# Patient Record
Sex: MALE | Race: WHITE | NOT HISPANIC OR LATINO | URBAN - METROPOLITAN AREA
[De-identification: names, ages, dates, MRNs, and addresses within clinical notes are randomized per-mention and may not be internally consistent; named-entity substitution may affect disease eponyms.]

---

## 2018-09-10 ENCOUNTER — OFFICE VISIT (OUTPATIENT)
Dept: RETAIL CLINIC | Facility: CLINIC | Age: 23
End: 2018-09-10

## 2018-09-10 VITALS
OXYGEN SATURATION: 98 % | SYSTOLIC BLOOD PRESSURE: 108 MMHG | RESPIRATION RATE: 18 BRPM | TEMPERATURE: 97.7 F | BODY MASS INDEX: 22.54 KG/M2 | HEART RATE: 78 BPM | HEIGHT: 70 IN | WEIGHT: 157.4 LBS | DIASTOLIC BLOOD PRESSURE: 64 MMHG

## 2018-09-10 DIAGNOSIS — H61.23 EXCESSIVE CERUMEN IN EAR CANAL, BILATERAL: ICD-10-CM

## 2018-09-10 DIAGNOSIS — J45.40 MODERATE PERSISTENT ASTHMA WITHOUT COMPLICATION: Primary | ICD-10-CM

## 2018-09-10 PROCEDURE — 99203 OFFICE O/P NEW LOW 30 MIN: CPT | Performed by: NURSE PRACTITIONER

## 2018-09-10 RX ORDER — ALBUTEROL SULFATE 90 UG/1
2 AEROSOL, METERED RESPIRATORY (INHALATION) EVERY 4 HOURS PRN
Qty: 1 INHALER | Refills: 0 | Status: SHIPPED | OUTPATIENT
Start: 2018-09-10 | End: 2018-09-20

## 2018-09-10 NOTE — PROGRESS NOTES
KEYLALaurence Rojo is a 23 y.o. male.   Chief Complaint   Patient presents with   • Med Refill     FRANCES Sanchez has a history of asthma, has been out of his inhaler since April 2018.  SOA & cough is worse at night.  He just moved here, doesn't have a PCP at this time.      Shortness of Breath   This is a chronic problem. The current episode started more than 1 year ago. The problem occurs intermittently. The problem has been unchanged. Associated symptoms include chest pain (with cough), ear pain (left), headaches, rhinorrhea and wheezing. Pertinent negatives include no abdominal pain, claudication, coryza, fever, hemoptysis, leg pain, leg swelling, neck pain, orthopnea, PND, rash, sore throat, sputum production, swollen glands, syncope or vomiting. The symptoms are aggravated by lying flat (warm air). The patient has no known risk factors for DVT/PE. He has tried cool air (hot showers, cool showers) for the symptoms. The treatment provided mild relief. His past medical history is significant for asthma.        The following portions of the patient's history were reviewed and updated as appropriate: allergies, current medications, past family history, past medical history, past social history, past surgical history and problem list.    Current Outpatient Prescriptions:   •  albuterol (PROVENTIL HFA;VENTOLIN HFA) 108 (90 Base) MCG/ACT inhaler, Inhale 2 puffs Every 4 (Four) Hours As Needed for Wheezing or Shortness of Air for up to 10 days., Disp: 1 inhaler, Rfl: 0  •  carbamide peroxide (DEBROX) 6.5 % otic solution, Administer 5 drops into both ears As Needed for Ear Pain., Disp: 14.8 mL, Rfl: 0    No Known Allergies    Review of Systems   Constitutional: Negative for appetite change, chills, fatigue and fever.   HENT: Positive for ear pain (left) and rhinorrhea. Negative for congestion, postnasal drip, sinus pressure, sneezing, sore throat and tinnitus.    Eyes: Negative for redness and  "itching.   Respiratory: Positive for cough, chest tightness (non-productive), shortness of breath and wheezing. Negative for hemoptysis and sputum production.    Cardiovascular: Positive for chest pain (with cough). Negative for palpitations, orthopnea, claudication, leg swelling, syncope and PND.   Gastrointestinal: Negative for abdominal pain, diarrhea, nausea and vomiting.   Musculoskeletal: Negative for neck pain.   Skin: Negative for rash.   Neurological: Positive for headaches. Negative for dizziness and light-headedness.       Objective     Visit Vitals  /64 (BP Location: Left arm, Patient Position: Sitting, Cuff Size: Adult)   Pulse 78   Temp 97.7 °F (36.5 °C) (Oral)   Resp 18   Ht 177.8 cm (70\")   Wt 71.4 kg (157 lb 6.4 oz)   SpO2 98%   BMI 22.58 kg/m²         Physical Exam   Constitutional: He is oriented to person, place, and time. He appears well-developed and well-nourished. No distress.   HENT:   Head: Normocephalic.   Right Ear: Tympanic membrane and external ear normal.   Left Ear: Tympanic membrane and external ear normal.   Nose: Nose normal. No sinus tenderness.   Mouth/Throat: Uvula is midline, oropharynx is clear and moist and mucous membranes are normal.   Excess cerumen bilateral ear canals   Eyes: Conjunctivae are normal.   Cardiovascular: Normal rate, regular rhythm and normal heart sounds.    Pulmonary/Chest: Effort normal. No accessory muscle usage. No respiratory distress. He has no decreased breath sounds. Wheezes: expiratory, scattered.   Lymphadenopathy:     He has no cervical adenopathy.   Neurological: He is alert and oriented to person, place, and time.       Lab Results (last 24 hours)     ** No results found for the last 24 hours. **          Assessment/Plan   Daniel was seen today for med refill.    Diagnoses and all orders for this visit:    Moderate persistent asthma without complication  -     albuterol (PROVENTIL HFA;VENTOLIN HFA) 108 (90 Base) MCG/ACT inhaler; Inhale " 2 puffs Every 4 (Four) Hours As Needed for Wheezing or Shortness of Air for up to 10 days.  - Daniel to schedule appointment to establish care with local PCP    Excessive cerumen in ear canal, bilateral  -     carbamide peroxide (DEBROX) 6.5 % otic solution; Administer 5 drops into both ears As Needed for Ear Pain.  - Do not use QTips to clean ears    An After Visit Summary was reviewed, printed and given to the patient.  Understanding verbalized and patient agreed with treatment plan.  If symptom(s) worsen or fail to improve, return to clinic, follow up with PCP or go to [facility].

## 2018-09-10 NOTE — PATIENT INSTRUCTIONS
Asthma, Adult  Asthma is a condition of the lungs in which the airways tighten and narrow. Asthma can make it hard to breathe. Asthma cannot be cured, but medicine and lifestyle changes can help control it. Asthma may be started (triggered) by:  · Animal skin flakes (dander).  · Dust.  · Cockroaches.  · Pollen.  · Mold.  · Smoke.  · Cleaning products.  · Hair sprays or aerosol sprays.  · Paint fumes or strong smells.  · Cold air, weather changes, and winds.  · Crying or laughing hard.  · Stress.  · Certain medicines or drugs.  · Foods, such as dried fruit, potato chips, and sparkling grape juice.  · Infections or conditions (colds, flu).  · Exercise.  · Certain medical conditions or diseases.  · Exercise or tiring activities.    Follow these instructions at home:  · Take medicine as told by your doctor.  · Use a peak flow meter as told by your doctor. A peak flow meter is a tool that measures how well the lungs are working.  · Record and keep track of the peak flow meter's readings.  · Understand and use the asthma action plan. An asthma action plan is a written plan for taking care of your asthma and treating your attacks.  · To help prevent asthma attacks:  ? Do not smoke. Stay away from secondhand smoke.  ? Change your heating and air conditioning filter often.  ? Limit your use of fireplaces and wood stoves.  ? Get rid of pests (such as roaches and mice) and their droppings.  ? Throw away plants if you see mold on them.  ? Clean your floors. Dust regularly. Use cleaning products that do not smell.  ? Have someone vacuum when you are not home. Use a vacuum  with a HEPA filter if possible.  ? Replace carpet with wood, tile, or vinyl carson. Carpet can trap animal skin flakes and dust.  ? Use allergy-proof pillows, mattress covers, and box spring covers.  ? Wash bed sheets and blankets every week in hot water and dry them in a dryer.  ? Use blankets that are made of polyester or cotton.  ? Clean bathrooms  and darryl with bleach. If possible, have someone repaint the walls in these rooms with mold-resistant paint. Keep out of the rooms that are being cleaned and painted.  ? Wash hands often.  Contact a doctor if:  · You have make a whistling sound when breaking (wheeze), have shortness of breath, or have a cough even if taking medicine to prevent attacks.  · The colored mucus you cough up (sputum) is thicker than usual.  · The colored mucus you cough up changes from clear or white to yellow, green, gray, or bloody.  · You have problems from the medicine you are taking such as:  ? A rash.  ? Itching.  ? Swelling.  ? Trouble breathing.  · You need reliever medicines more than 2-3 times a week.  · Your peak flow measurement is still at 50-79% of your personal best after following the action plan for 1 hour.  · You have a fever.  Get help right away if:  · You seem to be worse and are not responding to medicine during an asthma attack.  · You are short of breath even at rest.  · You get short of breath when doing very little activity.  · You have trouble eating, drinking, or talking.  · You have chest pain.  · You have a fast heartbeat.  · Your lips or fingernails start to turn blue.  · You are light-headed, dizzy, or faint.  · Your peak flow is less than 50% of your personal best.  This information is not intended to replace advice given to you by your health care provider. Make sure you discuss any questions you have with your health care provider.  Document Released: 06/05/2009 Document Revised: 05/25/2017 Document Reviewed: 07/17/2014  [employer] Interactive Patient Education © 2017 [employer]

## 2019-01-22 ENCOUNTER — OFFICE VISIT (OUTPATIENT)
Dept: RETAIL CLINIC | Facility: CLINIC | Age: 24
End: 2019-01-22

## 2019-01-22 VITALS
BODY MASS INDEX: 22.84 KG/M2 | HEART RATE: 98 BPM | WEIGHT: 168.6 LBS | HEIGHT: 72 IN | OXYGEN SATURATION: 98 % | DIASTOLIC BLOOD PRESSURE: 74 MMHG | SYSTOLIC BLOOD PRESSURE: 130 MMHG | TEMPERATURE: 97.8 F

## 2019-01-22 DIAGNOSIS — B00.89 HERPES SIMPLEX VIRUS (HSV) INFECTION OF BUTTOCK: Primary | ICD-10-CM

## 2019-01-22 DIAGNOSIS — Z23 NEED FOR IMMUNIZATION AGAINST INFLUENZA: ICD-10-CM

## 2019-01-22 DIAGNOSIS — J45.909 MILD ASTHMA, UNSPECIFIED WHETHER COMPLICATED, UNSPECIFIED WHETHER PERSISTENT: ICD-10-CM

## 2019-01-22 PROCEDURE — 99213 OFFICE O/P EST LOW 20 MIN: CPT | Performed by: NURSE PRACTITIONER

## 2019-01-22 RX ORDER — ALBUTEROL SULFATE 90 UG/1
2 AEROSOL, METERED RESPIRATORY (INHALATION) EVERY 4 HOURS PRN
Qty: 1 INHALER | Refills: 1 | Status: SHIPPED | OUTPATIENT
Start: 2019-01-22 | End: 2019-04-06 | Stop reason: SDUPTHER

## 2019-01-22 RX ORDER — VALACYCLOVIR HYDROCHLORIDE 1 G/1
1000 TABLET, FILM COATED ORAL 2 TIMES DAILY
Qty: 20 TABLET | Refills: 2 | Status: SHIPPED | OUTPATIENT
Start: 2019-01-22

## 2019-01-22 NOTE — PATIENT INSTRUCTIONS
Asthma, Adult  Asthma is a condition of the lungs in which the airways tighten and narrow. Asthma can make it hard to breathe. Asthma cannot be cured, but medicine and lifestyle changes can help control it. Asthma may be started (triggered) by:  · Animal skin flakes (dander).  · Dust.  · Cockroaches.  · Pollen.  · Mold.  · Smoke.  · Cleaning products.  · Hair sprays or aerosol sprays.  · Paint fumes or strong smells.  · Cold air, weather changes, and winds.  · Crying or laughing hard.  · Stress.  · Certain medicines or drugs.  · Foods, such as dried fruit, potato chips, and sparkling grape juice.  · Infections or conditions (colds, flu).  · Exercise.  · Certain medical conditions or diseases.  · Exercise or tiring activities.    Follow these instructions at home:  · Take medicine as told by your doctor.  · Use a peak flow meter as told by your doctor. A peak flow meter is a tool that measures how well the lungs are working.  · Record and keep track of the peak flow meter's readings.  · Understand and use the asthma action plan. An asthma action plan is a written plan for taking care of your asthma and treating your attacks.  · To help prevent asthma attacks:  ? Do not smoke. Stay away from secondhand smoke.  ? Change your heating and air conditioning filter often.  ? Limit your use of fireplaces and wood stoves.  ? Get rid of pests (such as roaches and mice) and their droppings.  ? Throw away plants if you see mold on them.  ? Clean your floors. Dust regularly. Use cleaning products that do not smell.  ? Have someone vacuum when you are not home. Use a vacuum  with a HEPA filter if possible.  ? Replace carpet with wood, tile, or vinyl carson. Carpet can trap animal skin flakes and dust.  ? Use allergy-proof pillows, mattress covers, and box spring covers.  ? Wash bed sheets and blankets every week in hot water and dry them in a dryer.  ? Use blankets that are made of polyester or cotton.  ? Clean bathrooms  and darryl with bleach. If possible, have someone repaint the walls in these rooms with mold-resistant paint. Keep out of the rooms that are being cleaned and painted.  ? Wash hands often.  Contact a doctor if:  · You have make a whistling sound when breaking (wheeze), have shortness of breath, or have a cough even if taking medicine to prevent attacks.  · The colored mucus you cough up (sputum) is thicker than usual.  · The colored mucus you cough up changes from clear or white to yellow, green, gray, or bloody.  · You have problems from the medicine you are taking such as:  ? A rash.  ? Itching.  ? Swelling.  ? Trouble breathing.  · You need reliever medicines more than 2-3 times a week.  · Your peak flow measurement is still at 50-79% of your personal best after following the action plan for 1 hour.  · You have a fever.  Get help right away if:  · You seem to be worse and are not responding to medicine during an asthma attack.  · You are short of breath even at rest.  · You get short of breath when doing very little activity.  · You have trouble eating, drinking, or talking.  · You have chest pain.  · You have a fast heartbeat.  · Your lips or fingernails start to turn blue.  · You are light-headed, dizzy, or faint.  · Your peak flow is less than 50% of your personal best.  This information is not intended to replace advice given to you by your health care provider. Make sure you discuss any questions you have with your health care provider.  Document Released: 06/05/2009 Document Revised: 05/25/2017 Document Reviewed: 07/17/2014  [employer] Interactive Patient Education © 2017 [employer]  Genital Herpes  Genital herpes is a common sexually transmitted infection (STI) that is caused by a virus. The virus spreads from person to person through sexual contact. Infection can cause itching, blisters, and sores around the genitals or rectum. Symptoms may last several days and then go away This is called an outbreak.  However, the virus remains in your body, so you may have more outbreaks in the future. The time between outbreaks varies and can be months or years.  Genital herpes affects men and women. It is particularly concerning for pregnant women because the virus can be passed to the baby during delivery and can cause serious problems. Genital herpes is also a concern for people who have a weak disease-fighting (immune) system.  What are the causes?  This condition is caused by the herpes simplex virus (HSV) type 1 or type 2. The virus may spread through:  · Sexual contact with an infected person, including vaginal, anal, and oral sex.  · Contact with fluid from a herpes sore.  · The skin. This means that you can get herpes from an infected partner even if he or she does not have a visible sore or does not know that he or she is infected.    What increases the risk?  You are more likely to develop this condition if:  · You have sex with many partners.  · You do not use latex condoms during sex.    What are the signs or symptoms?  Most people do not have symptoms (asymptomatic) or have mild symptoms that may be mistaken for other skin problems. Symptoms may include:  · Small red bumps near the genitals, rectum, or mouth. These bumps turn into blisters and then turn into sores.  · Flu-like symptoms, including:  ? Fever.  ? Body aches.  ? Swollen lymph nodes.  ? Headache.  · Painful urination.  · Pain and itching in the genital area or rectal area.  · Vaginal discharge.  · Tingling or shooting pain in the legs and buttocks.    Generally, symptoms are more severe and last longer during the first (primary) outbreak. Flu-like symptoms are also more common during the primary outbreak.  How is this diagnosed?  Genital herpes may be diagnosed based on:  · A physical exam.  · Your medical history.  · Blood tests.  · A test of a fluid sample (culture) from an open sore.    How is this treated?  There is no cure for this condition, but  treatment with antiviral medicines that are taken by mouth (orally) can do the following:  · Speed up healing and relieve symptoms.  · Help to reduce the spread of the virus to sexual partners.  · Limit the chance of future outbreaks, or make future outbreaks shorter.  · Lessen symptoms of future outbreaks.    Your health care provider may also recommend pain relief medicines, such as aspirin or ibuprofen.  Follow these instructions at home:  Sexual activity  · Do not have sexual contact during active outbreaks.  · Practice safe sex. Latex condoms and female condoms may help prevent the spread of the herpes virus.  General instructions  · Keep the affected areas dry and clean.  · Take over-the-counter and prescription medicines only as told by your health care provider.  · Avoid rubbing or touching blisters and sores. If you do touch blisters or sores:  ? Wash your hands thoroughly with soap and water.  ? Do not touch your eyes afterward.  · To help relieve pain or itching, you may take the following actions as directed by your health care provider:  ? Apply a cold, wet cloth (cold compress) to affected areas 4-6 times a day.  ? Apply a substance that protects your skin and reduces bleeding (astringent).  ? Apply a gel that helps relieve pain around sores (lidocaine gel).  ? Take a warm, shallow bath that cleans the genital area (sitz bath).  · Keep all follow-up visits as told by your health care provider. This is important.  How is this prevented?  · Use condoms. Although anyone can get genital herpes during sexual contact, even with the use of a condom, a condom can provide some protection.  · Avoid having multiple sexual partners.  · Talk with your sexual partner about any symptoms either of you may have. Also, talk with your partner about any history of STIs.  · Get tested for STIs before you have sex. Ask your partner to do the same.  · Do not have sexual contact if you have symptoms of genital  herpes.  Contact a health care provider if:  · Your symptoms are not improving with medicine.  · Your symptoms return.  · You have new symptoms.  · You have a fever.  · You have abdominal pain.  · You have redness, swelling, or pain in your eye.  · You notice new sores on other parts of your body.  · You are a woman and experience bleeding between menstrual periods.  · You have had herpes and you become pregnant or plan to become pregnant.  Summary  · Genital herpes is a common sexually transmitted infection (STI) that is caused by the herpes simplex virus (HSV) type 1 or type 2.  · These viruses are most often spread through sexual contact with an infected person.  · You are more likely to develop this condition if you have sex with many partners or you have unprotected sex.  · Most people do not have symptoms (asymptomatic) or have mild symptoms that may be mistaken for other skin problems. Symptoms occur as outbreaks that may happen months or years apart.  · There is no cure for this condition, but treatment with oral antiviral medicines can reduce symptoms, reduce the chance of spreading the virus to a partner, prevent future outbreaks, or shorten future outbreaks.  This information is not intended to replace advice given to you by your health care provider. Make sure you discuss any questions you have with your health care provider.  Document Released: 12/15/2001 Document Revised: 11/17/2017 Document Reviewed: 11/17/2017  [employer] Interactive Patient Education © 2018 [employer]

## 2019-01-22 NOTE — PROGRESS NOTES
KEYLALaurence Rojo is a 23 y.o. male.   Chief Complaint   Patient presents with   • Rash      History of Present Illness   Patient presents with request for refill on his albuterol inhaler for his long standing Asthma. He states he is stable at present but will need it for possible attacks. He is without a PCP at present and has recently experienced another problem dealing with a painful rash outbreak in his anal area and his scrotal region. He thinks he has HSV. He states this is a new problem. He is monogamous with his GF for 1 year now and she is asymptomatic. The lesions have been present for 3 days and he is keeping it clean and applying alcohol to the rash. He describes the rash as having a burning feeling. He is eager to obtain a pcp and was given the PCP list of [facility].   The following portions of the patient's history were reviewed and updated as appropriate: allergies, current medications, past family history, past medical history, past social history, past surgical history and problem list.    Current Outpatient Medications:   •  albuterol sulfate  (90 Base) MCG/ACT inhaler, Inhale 2 puffs Every 4 (Four) Hours As Needed for Wheezing or Shortness of Air., Disp: 1 inhaler, Rfl: 1  •  carbamide peroxide (DEBROX) 6.5 % otic solution, Administer 5 drops into both ears As Needed for Ear Pain., Disp: 14.8 mL, Rfl: 0  •  valACYclovir (VALTREX) 1000 MG tablet, Take 1 tablet by mouth 2 (Two) Times a Day., Disp: 20 tablet, Rfl: 2    No Known Allergies    Review of Systems   Constitutional: Positive for activity change and fatigue. Negative for appetite change and fever.   HENT: Negative.    Eyes: Negative.    Respiratory: Negative.    Cardiovascular: Negative.    Gastrointestinal: Negative.    Musculoskeletal: Negative.    Skin: Positive for rash.        Rash noted around anus that has a burning painful sensation.   Neurological: Negative.    Hematological: Negative.   "  Psychiatric/Behavioral: Negative.        Objective     Visit Vitals  /74   Pulse 98   Temp 97.8 °F (36.6 °C)   Ht 182.9 cm (72\")   Wt 76.5 kg (168 lb 9.6 oz)   SpO2 98%   BMI 22.87 kg/m²         Physical Exam   Constitutional: He is oriented to person, place, and time. He appears well-developed and well-nourished.   HENT:   Head: Normocephalic and atraumatic.   Cardiovascular: Normal rate, regular rhythm and normal heart sounds. Exam reveals no gallop and no friction rub.   No murmur heard.  Pulmonary/Chest: Effort normal and breath sounds normal. No stridor. No respiratory distress. He has no wheezes. He has no rales.   Neurological: He is alert and oriented to person, place, and time.   Skin: Skin is warm and dry. Rash noted.   Patient reports vesicular rash around anus    Psychiatric: He has a normal mood and affect. His behavior is normal. Judgment and thought content normal.   Nursing note and vitals reviewed.      Lab Results (last 24 hours)     ** No results found for the last 24 hours. **          Assessment/Plan   Daniel was seen today for rash.    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    Mild asthma, unspecified whether complicated, unspecified whether persistent  -     albuterol sulfate  (90 Base) MCG/ACT inhaler; Inhale 2 puffs Every 4 (Four) Hours As Needed for Wheezing or Shortness of Air.    Need for immunization against influenza    instructed patient to call for pcp appointment asap.   Advised to take  mg. Every 6 hours prn pain  Practice safe sex and use protection consistently     GERARDO Magaña         "

## 2019-04-06 ENCOUNTER — OFFICE VISIT (OUTPATIENT)
Dept: RETAIL CLINIC | Facility: CLINIC | Age: 24
End: 2019-04-06

## 2019-04-06 VITALS
TEMPERATURE: 96.9 F | HEIGHT: 70 IN | BODY MASS INDEX: 24.48 KG/M2 | WEIGHT: 171 LBS | HEART RATE: 103 BPM | DIASTOLIC BLOOD PRESSURE: 80 MMHG | SYSTOLIC BLOOD PRESSURE: 110 MMHG | OXYGEN SATURATION: 96 %

## 2019-04-06 DIAGNOSIS — J30.2 SEASONAL ALLERGIES: ICD-10-CM

## 2019-04-06 DIAGNOSIS — J45.20 MILD INTERMITTENT ASTHMA WITHOUT COMPLICATION: Primary | ICD-10-CM

## 2019-04-06 PROCEDURE — 99213 OFFICE O/P EST LOW 20 MIN: CPT | Performed by: NURSE PRACTITIONER

## 2019-04-06 RX ORDER — PREDNISONE 10 MG/1
TABLET ORAL DAILY
Qty: 21 EACH | Refills: 0 | Status: SHIPPED | OUTPATIENT
Start: 2019-04-06 | End: 2019-04-12

## 2019-04-06 RX ORDER — LORATADINE 10 MG/1
10 TABLET ORAL DAILY
Qty: 30 TABLET | Refills: 1 | Status: SHIPPED | OUTPATIENT
Start: 2019-04-06

## 2019-04-06 RX ORDER — ALBUTEROL SULFATE 90 UG/1
2 AEROSOL, METERED RESPIRATORY (INHALATION) EVERY 4 HOURS PRN
Qty: 1 INHALER | Refills: 1 | Status: SHIPPED | OUTPATIENT
Start: 2019-04-06

## 2019-04-06 NOTE — PROGRESS NOTES
"JAIME Rojo is a 24 y.o. male.   Chief Complaint   Patient presents with   • Asthma      History of Present Illness   Known asthma history and needs a refill on his albuterol inhaler today. Has had cough, stuffy nose and mild shortness of breath for over a week. He has been out of the inhaler a week as well. No pain, fever or wheezing.   The following portions of the patient's history were reviewed and updated as appropriate: allergies, current medications, past family history, past medical history, past social history, past surgical history and problem list.    Current Outpatient Medications:   •  albuterol sulfate  (90 Base) MCG/ACT inhaler, Inhale 2 puffs Every 4 (Four) Hours As Needed for Wheezing or Shortness of Air., Disp: 1 inhaler, Rfl: 1  •  carbamide peroxide (DEBROX) 6.5 % otic solution, Administer 5 drops into both ears As Needed for Ear Pain., Disp: 14.8 mL, Rfl: 0  •  loratadine (CLARITIN) 10 MG tablet, Take 1 tablet by mouth Daily., Disp: 30 tablet, Rfl: 1  •  predniSONE (DELTASONE) 10 MG (21) tablet pack, Take  by mouth Daily for 6 days., Disp: 21 each, Rfl: 0  •  valACYclovir (VALTREX) 1000 MG tablet, Take 1 tablet by mouth 2 (Two) Times a Day., Disp: 20 tablet, Rfl: 2    No Known Allergies    Review of Systems   Constitutional: Negative for activity change, appetite change, fatigue and fever.   HENT: Positive for congestion and rhinorrhea. Negative for ear discharge, ear pain, sinus pressure, sinus pain, trouble swallowing and voice change.    Eyes: Negative.    Respiratory: Positive for cough. Negative for shortness of breath and wheezing.    Cardiovascular: Negative.    Musculoskeletal: Negative.    Skin: Negative.    Allergic/Immunologic: Negative.    Neurological: Negative.    Hematological: Negative.        Objective     Visit Vitals  /80   Pulse 103   Temp 96.9 °F (36.1 °C)   Ht 177.8 cm (70\")   Wt 77.6 kg (171 lb)   SpO2 96%   BMI 24.54 kg/m² "         Physical Exam   Constitutional: He is oriented to person, place, and time. Vital signs are normal. He appears well-developed and well-nourished. He is cooperative. He is easily aroused.  Non-toxic appearance. He does not have a sickly appearance. He does not appear ill. No distress.   HENT:   Head: Normocephalic and atraumatic.   Right Ear: Hearing, tympanic membrane, external ear and ear canal normal.   Left Ear: Hearing, tympanic membrane, external ear and ear canal normal.   Nose: Mucosal edema and rhinorrhea present. Right sinus exhibits no maxillary sinus tenderness and no frontal sinus tenderness. Left sinus exhibits no maxillary sinus tenderness and no frontal sinus tenderness.   Mouth/Throat: Oropharynx is clear and moist and mucous membranes are normal. No oropharyngeal exudate, posterior oropharyngeal edema, posterior oropharyngeal erythema or tonsillar abscesses. Tonsils are 0 on the right. Tonsils are 0 on the left. No tonsillar exudate.   Eyes: Conjunctivae and EOM are normal. Pupils are equal, round, and reactive to light.   Cardiovascular: Normal rate, regular rhythm and normal heart sounds.   No murmur heard.  Pulmonary/Chest: Effort normal and breath sounds normal. No respiratory distress. He has no wheezes.   Lymphadenopathy:     He has no cervical adenopathy.   Neurological: He is alert, oriented to person, place, and time and easily aroused.   Skin: Skin is warm and dry.   Psychiatric: He has a normal mood and affect. His behavior is normal. Judgment and thought content normal.   Nursing note and vitals reviewed.      Lab Results (last 24 hours)     ** No results found for the last 24 hours. **          Assessment/Plan   Daniel was seen today for asthma.    Diagnoses and all orders for this visit:    Mild intermittent asthma without complication  -     albuterol sulfate  (90 Base) MCG/ACT inhaler; Inhale 2 puffs Every 4 (Four) Hours As Needed for Wheezing or Shortness of Air.  -      predniSONE (DELTASONE) 10 MG (21) tablet pack; Take  by mouth Daily for 6 days.    Seasonal allergies  -     loratadine (CLARITIN) 10 MG tablet; Take 1 tablet by mouth Daily.    GERARDO Magaña

## 2019-04-06 NOTE — PATIENT INSTRUCTIONS
Asthma, Adult  Asthma is a long-term (chronic) condition in which the airways get tight and narrow. The airways are the breathing passages that lead from the nose and mouth down into the lungs. A person with asthma will have times when symptoms get worse. These are called asthma attacks. They can cause coughing, whistling sounds when you breathe (wheezing), shortness of breath, and chest pain. They can make it hard to breathe. There is no cure for asthma, but medicines and lifestyle changes can help control it.  There are many things that can bring on an asthma attack or make asthma symptoms worse (triggers). Common triggers include:  · Mold.  · Dust.  · Cigarette smoke.  · Cockroaches.  · Things that can cause allergy symptoms (allergens). These include animal skin flakes (dander) and pollen from trees or grass.  · Things that pollute the air. These may include household , wood smoke, smog, or chemical odors.  · Cold air, weather changes, and wind.  · Crying or laughing hard.  · Stress.  · Certain medicines or drugs.  · Certain foods such as dried fruit, potato chips, and grape juice.  · Infections, such as a cold or the flu.  · Certain medical conditions or diseases.  · Exercise or tiring activities.    Asthma may be treated with medicines and by staying away from the things that cause asthma attacks. Types of medicines may include:  · Controller medicines. These help prevent asthma symptoms. They are usually taken every day.  · Fast-acting reliever or rescue medicines. These quickly relieve asthma symptoms. They are used as needed and provide short-term relief.  · Allergy medicines if your attacks are brought on by allergens.  · Medicines to help control the body's defense (immune) system.    Follow these instructions at home:  Avoiding triggers in your home  · Change your heating and air conditioning filter often.  · Limit your use of fireplaces and wood stoves.  · Get rid of pests (such as roaches and  mice) and their droppings.  · Throw away plants if you see mold on them.  · Clean your floors. Dust regularly. Use cleaning products that do not smell.  · Have someone vacuum when you are not home. Use a vacuum  with a HEPA filter if possible.  · Replace carpet with wood, tile, or vinyl carson. Carpet can trap animal skin flakes and dust.  · Use allergy-proof pillows, mattress covers, and box spring covers.  · Wash bed sheets and blankets every week in hot water. Dry them in a dryer.  · Keep your bedroom free of any triggers.   · Avoid pets and keep windows closed when things that cause allergy symptoms are in the air.  · Use blankets that are made of polyester or cotton.  · Clean bathrooms and darryl with bleach. If possible, have someone repaint the walls in these rooms with mold-resistant paint. Keep out of the rooms that are being cleaned and painted.  · Wash your hands often with soap and water. If soap and water are not available, use hand .  · Do not allow anyone to smoke in your home.  General instructions  · Take over-the-counter and prescription medicines only as told by your doctor.  ? Talk with your doctor if you have questions about how or when to take your medicines.  ? Make note if you need to use your medicines more often than usual.  · Do not use any products that contain nicotine or tobacco, such as cigarettes and e-cigarettes. If you need help quitting, ask your doctor.  · Stay away from secondhand smoke.  · Avoid doing things outdoors when allergen counts are high and when air quality is low.  · Wear a ski mask when doing outdoor activities in the winter. The mask should cover your nose and mouth. Exercise indoors on cold days if you can.  · Warm up before you exercise. Take time to cool down after exercise.  · Use a peak flow meter as told by your doctor. A peak flow meter is a tool that measures how well the lungs are working.  · Keep track of the peak flow meter's readings.  Write them down.  · Follow your asthma action plan. This is a written plan for taking care of your asthma and treating your attacks.  · Make sure you get all the shots (vaccines) that your doctor recommends. Ask your doctor about a flu shot and a pneumonia shot.  · Keep all follow-up visits as told by your doctor. This is important.  Contact a doctor if:  · You have wheezing, shortness of breath, or a cough even while taking medicine to prevent attacks.  · The mucus you cough up (sputum) is thicker than usual.  · The mucus you cough up changes from clear or white to yellow, green, gray, or bloody.  · You have problems from the medicine you are taking, such as:  ? A rash.  ? Itching.  ? Swelling.  ? Trouble breathing.  · You need reliever medicines more than 2-3 times a week.  · Your peak flow reading is still at 50-79% of your personal best after following the action plan for 1 hour.  · You have a fever.  Get help right away if:  · You seem to be worse and are not responding to medicine during an asthma attack.  · You are short of breath even at rest.  · You get short of breath when doing very little activity.  · You have trouble eating, drinking, or talking.  · You have chest pain or tightness.  · You have a fast heartbeat.  · Your lips or fingernails start to turn blue.  · You are light-headed or dizzy, or you faint.  · Your peak flow is less than 50% of your personal best.  · You feel too tired to breathe normally.  Summary  · Asthma is a long-term (chronic) condition in which the airways get tight and narrow. An asthma attack can make it hard to breathe.  · Asthma cannot be cured, but medicines and lifestyle changes can help control it.  · Make sure you understand how to avoid triggers and how and when to use your medicines.  This information is not intended to replace advice given to you by your health care provider. Make sure you discuss any questions you have with your health care provider.  Document  Released: 06/05/2009 Document Revised: 01/22/2018 Document Reviewed: 01/22/2018  [employer] Interactive Patient Education © 2019 [employer]